# Patient Record
Sex: FEMALE | Race: ASIAN | NOT HISPANIC OR LATINO | URBAN - METROPOLITAN AREA
[De-identification: names, ages, dates, MRNs, and addresses within clinical notes are randomized per-mention and may not be internally consistent; named-entity substitution may affect disease eponyms.]

---

## 2022-01-14 ENCOUNTER — OUTPATIENT (OUTPATIENT)
Dept: OUTPATIENT SERVICES | Facility: HOSPITAL | Age: 27
LOS: 1 days | End: 2022-01-14

## 2022-01-14 ENCOUNTER — RESULT CHARGE (OUTPATIENT)
Age: 27
End: 2022-01-14

## 2022-01-14 ENCOUNTER — APPOINTMENT (OUTPATIENT)
Dept: OBGYN | Facility: HOSPITAL | Age: 27
End: 2022-01-14

## 2022-01-14 DIAGNOSIS — Z00.00 ENCOUNTER FOR GENERAL ADULT MEDICAL EXAMINATION W/OUT ABNORMAL FINDINGS: ICD-10-CM

## 2022-01-15 LAB
HCG UR QL: POSITIVE
QUALITY CONTROL: YES

## 2022-01-18 DIAGNOSIS — Z32.00 ENCOUNTER FOR PREGNANCY TEST, RESULT UNKNOWN: ICD-10-CM

## 2022-02-15 ENCOUNTER — APPOINTMENT (OUTPATIENT)
Dept: OBGYN | Facility: HOSPITAL | Age: 27
End: 2022-02-15

## 2022-05-27 ENCOUNTER — EMERGENCY (EMERGENCY)
Facility: HOSPITAL | Age: 27
LOS: 1 days | Discharge: HOME | End: 2022-05-27
Admitting: EMERGENCY MEDICINE
Payer: MEDICAID

## 2022-05-27 VITALS
TEMPERATURE: 98 F | RESPIRATION RATE: 16 BRPM | DIASTOLIC BLOOD PRESSURE: 75 MMHG | OXYGEN SATURATION: 99 % | HEART RATE: 84 BPM | SYSTOLIC BLOOD PRESSURE: 125 MMHG

## 2022-05-27 PROCEDURE — 99282 EMERGENCY DEPT VISIT SF MDM: CPT

## 2022-05-27 NOTE — ED PROVIDER NOTE - PROGRESS NOTE DETAILS
PA PAMELLA: Pt is medically stable for discharge and follow up with PMD & dental. The patient was given verbal and written discharge instructions. Specifically, instructions when to return to the ED and when to seek follow-up from their pcp was discussed. Any specialty follow-up was discussed, including how to make an appointment.  Instructions were discussed in simple, plain language and was understood by the patient. The patient understands that should their symptoms worsen or any new symptoms arise, they should return to the ED immediately for further evaluation. All pt's questions were answered. Patient verbalizes understanding. PA PAMELLA: Pt is medically stable for discharge and follow up with PMD & dental. Pt left ED w/o discharge paperwork. Unable to obtain fetal doppler.  Spoke with patient over the phone, states she left due to family emergency, unable to wait for paperwork. Informed discharge instructions over the phone, all questions answered, return precautions given.

## 2022-05-27 NOTE — ED PROVIDER NOTE - OBJECTIVE STATEMENT
27 yo F, with no significant PMH, currently 28 weeks pregnant, presents to ER c/o left lower tooth pain x2 days. Reports having intermittent left lower tooth pain, unable to sleep at night. Admits taking Tylenol for pain. Reports dental appointment next Friday, but couldn't wait. Admits fetal movements. Denies any fever, chills, facial swelling, weakness, numbness, drainage, n/v/d, abdominal pain, pelvic pain, vaginal bleeding, dysuria, or any other complaints.

## 2022-05-27 NOTE — ED PROVIDER NOTE - NSFOLLOWUPINSTRUCTIONS_ED_ALL_ED_FT
Take Tylenol 650mg (Two 325 mg pills) every 4-6 hours as needed for pain.     Follow up with the [facility] dental clinic as soon as possible.  Call [phone] for an appointment.  Please return to the ER for severe pain, fevers, severe bleeding, increased swelling or any other concerns.   Eat a soft diet and chew on the opposite side.  Brush your teeth several times a day.

## 2022-05-27 NOTE — ED PROVIDER NOTE - CLINICAL SUMMARY MEDICAL DECISION MAKING FREE TEXT BOX
27 yo F, with no significant PMH, currently 28 weeks pregnant, presents to ER c/o left lower tooth pain x2 days. well appearing female. Afebrile. No evidence of dental abscess. likely dental caries. Pt has no OB/GYN complaints at this time. Pt declined pain med including Tylenol, Oxycodone (discussed can affect fetus), antibiotic. Pt doesn't want to expose baby w/ dental X-ray. Plan: discharge w/ dental follow up.

## 2022-05-27 NOTE — ED PROVIDER NOTE - TOOTH FINDINGS
left lower tooth #20 with large cavity, mild tender to percussion, no gingival erythema, no fluctuance, no abscess

## 2022-05-27 NOTE — ED PROVIDER NOTE - PATIENT PORTAL LINK FT
You can access the FollowMyHealth Patient Portal offered by [facility] by registering at the following website: [email or web address]. By joining [email or web address]’s FollowMyHealth portal, you will also be able to view your health information using other applications (apps) compatible with our system.

## 2022-05-30 ENCOUNTER — EMERGENCY (EMERGENCY)
Facility: HOSPITAL | Age: 27
LOS: 1 days | Discharge: HOME | End: 2022-05-30
Attending: STUDENT IN AN ORGANIZED HEALTH CARE EDUCATION/TRAINING PROGRAM | Admitting: STUDENT IN AN ORGANIZED HEALTH CARE EDUCATION/TRAINING PROGRAM
Payer: MEDICAID

## 2022-05-30 VITALS
HEART RATE: 108 BPM | OXYGEN SATURATION: 100 % | SYSTOLIC BLOOD PRESSURE: 118 MMHG | RESPIRATION RATE: 16 BRPM | TEMPERATURE: 98 F | DIASTOLIC BLOOD PRESSURE: 71 MMHG

## 2022-05-30 PROBLEM — Z78.9 OTHER SPECIFIED HEALTH STATUS: Chronic | Status: ACTIVE | Noted: 2022-05-27

## 2022-05-30 PROCEDURE — 99283 EMERGENCY DEPT VISIT LOW MDM: CPT

## 2022-05-30 RX ADMIN — Medication 1 TABLET(S): at 11:45

## 2022-05-30 NOTE — ED PROVIDER NOTE - OBJECTIVE STATEMENT
25yo F 28wks pregnant p/w concern of L lower dental pain on known cavity, seen in ED several days ago and dc'ed, now w/ increased jaw swelling and pain on area.  Has not seen dentist yet. No fever, chills, neck swelling/pain, cp, sob, abd pain, vaginal bleeding.

## 2022-05-30 NOTE — ED ADULT NURSE NOTE - OBJECTIVE STATEMENT
A&Ox4. ambulatory. c/o left side dental pain. NAD. pt denies SOB, chest pain, dizziness, weakness, urinary symptoms, HA, n/v/d, fevers, chills. respirations are even and un labored. medication given as ordered. safety precautions maintained.

## 2022-05-30 NOTE — ED PROVIDER NOTE - CLINICAL SUMMARY MEDICAL DECISION MAKING FREE TEXT BOX
Dental pain, no singificant jaw swelling, no signs/symptoms of ludwigs or deep sace infx. No appreciable abscess for drainage, will give PO abx and dental referral for extraction and return precautions

## 2022-05-30 NOTE — ED ADULT TRIAGE NOTE - CHIEF COMPLAINT QUOTE
pt 28 weeks pregnant c/o left jaw pain with swelling that has increased from Friday when seen in ED last, pt unable to see dentist over the weekend. some relief with heat packs and Tylenol.

## 2022-05-30 NOTE — ED PROVIDER NOTE - PROGRESS NOTE DETAILS
Informed pt of plan for discharge with instructions to follow up with PMD. Pt/caregiver expressed understanding of plan and agrees with plan for discharge. Strict return precautions discussed with patient in layman's terms, patient demonstrated understanding of return precuations.

## 2022-05-30 NOTE — ED PROVIDER NOTE - PATIENT PORTAL LINK FT
You can access the FollowMyHealth Patient Portal offered by [facility] by registering at the following website: [email or web address]. By joining [employer]’s FollowMyHealth portal, you will also be able to view your health information using other applications (apps) compatible with our system.

## 2022-05-30 NOTE — ED PROVIDER NOTE - NSFOLLOWUPINSTRUCTIONS_ED_ALL_ED_FT
Follow up with the [facility] dental clinic as soon as possible.  Call [phone] for an appointment.  Please return to the ER for severe pain, fevers, severe bleeding, increased swelling or any other concerns.   Eat a soft diet and chew on the opposite side.  Brush your teeth several times a day.

## 2022-05-30 NOTE — ED PROVIDER NOTE - PHYSICAL EXAMINATION
VITALS: Initial triage and subsequent vitals have been reviewed by me.  Gen: Well appearing, NAD, alert, non-toxic  Head: NCAT  HEENT: MMM, normal conjunctiva, anicteric, neck supple, no significant mandibular swelling, no neck/submental/sublingual/submandibular edema/induration/erythema, dental caries L lower mandible, no appreciable significant edema/fluctuance or erythema  Lung: breathing comfortably  CV: RRR,  Abd: soft, NTND  MSK: No edema, no visible deformities  Neuro: Moving all extremity grossly, following commands appropriately, fluid speech  Skin: Warm and dry, no evidence of rash  Psych: normal mood and affect

## 2022-05-30 NOTE — ED PROVIDER NOTE - NS ED ROS FT
CONSTITUTIONAL: No fevers, no chills, no lightheadedness, no dizziness  EYES: no visual changes, no eye pain  EARS: no ear drainage, no ear pain, no change in hearing  NOSE: no nasal congestion  MOUTH/THROAT: no sore throat  CV: No chest pain, no palpitations  RESP: No SOB, no cough  GI: No n/v/d, no abd pain  : no dysuria, no hematuria, no flank pain  MSK: no back pain, no extremity pain  SKIN: no rashes  NEURO: no headache, no focal weakness, no decreased sensation/paresthesias   PSYCHIATRIC: no known mental health issues.

## 2022-06-13 ENCOUNTER — TRANSCRIPTION ENCOUNTER (OUTPATIENT)
Age: 27
End: 2022-06-13

## 2024-09-17 ENCOUNTER — EMERGENCY (EMERGENCY)
Facility: HOSPITAL | Age: 29
LOS: 1 days | Discharge: HOME | End: 2024-09-17
Admitting: EMERGENCY MEDICINE
Payer: SELF-PAY

## 2024-09-17 VITALS
SYSTOLIC BLOOD PRESSURE: 122 MMHG | DIASTOLIC BLOOD PRESSURE: 74 MMHG | WEIGHT: 132.06 LBS | OXYGEN SATURATION: 99 % | RESPIRATION RATE: 16 BRPM | HEART RATE: 73 BPM | TEMPERATURE: 98 F

## 2024-09-17 PROCEDURE — 99053 MED SERV 10PM-8AM 24 HR FAC: CPT

## 2024-09-17 PROCEDURE — 99285 EMERGENCY DEPT VISIT HI MDM: CPT

## 2024-09-17 NOTE — ED ADULT TRIAGE NOTE - CHIEF COMPLAINT QUOTE
pt complains of vaginal bleeding and abd cramping soaking approx. 2 pads an hour. pt is scheduled for DC on Saturday pt was told by OB " that the baby  had no heart beat"   at 9 weeks pregnant on 8/31  . pt denies blood thinner use,  changes in vision, chest pain, headache, nausea, dizziness, vomiting,  SOB, fever, and  chills. pt denies past medical hx.

## 2024-09-18 VITALS
OXYGEN SATURATION: 100 % | RESPIRATION RATE: 16 BRPM | TEMPERATURE: 99 F | DIASTOLIC BLOOD PRESSURE: 70 MMHG | SYSTOLIC BLOOD PRESSURE: 117 MMHG | HEART RATE: 70 BPM

## 2024-09-18 LAB
ALBUMIN SERPL ELPH-MCNC: 4.2 G/DL — SIGNIFICANT CHANGE UP (ref 3.3–5)
ALP SERPL-CCNC: 73 U/L — SIGNIFICANT CHANGE UP (ref 40–120)
ALT FLD-CCNC: 25 U/L — SIGNIFICANT CHANGE UP (ref 4–33)
ANION GAP SERPL CALC-SCNC: 14 MMOL/L — SIGNIFICANT CHANGE UP (ref 7–14)
APPEARANCE UR: ABNORMAL
APTT BLD: 31.1 SEC — SIGNIFICANT CHANGE UP (ref 24.5–35.6)
AST SERPL-CCNC: 29 U/L — SIGNIFICANT CHANGE UP (ref 4–32)
BACTERIA # UR AUTO: NEGATIVE /HPF — SIGNIFICANT CHANGE UP
BASOPHILS # BLD AUTO: 0.05 K/UL — SIGNIFICANT CHANGE UP (ref 0–0.2)
BASOPHILS NFR BLD AUTO: 0.3 % — SIGNIFICANT CHANGE UP (ref 0–2)
BILIRUB SERPL-MCNC: <0.2 MG/DL — SIGNIFICANT CHANGE UP (ref 0.2–1.2)
BILIRUB UR-MCNC: NEGATIVE — SIGNIFICANT CHANGE UP
BLD GP AB SCN SERPL QL: NEGATIVE — SIGNIFICANT CHANGE UP
BUN SERPL-MCNC: 12 MG/DL — SIGNIFICANT CHANGE UP (ref 7–23)
CALCIUM SERPL-MCNC: 8.5 MG/DL — SIGNIFICANT CHANGE UP (ref 8.4–10.5)
CAST: 1 /LPF — SIGNIFICANT CHANGE UP (ref 0–4)
CHLORIDE SERPL-SCNC: 98 MMOL/L — SIGNIFICANT CHANGE UP (ref 98–107)
CO2 SERPL-SCNC: 20 MMOL/L — LOW (ref 22–31)
COLOR SPEC: YELLOW — SIGNIFICANT CHANGE UP
CREAT SERPL-MCNC: 0.56 MG/DL — SIGNIFICANT CHANGE UP (ref 0.5–1.3)
DIFF PNL FLD: ABNORMAL
EGFR: 126 ML/MIN/1.73M2 — SIGNIFICANT CHANGE UP
EOSINOPHIL # BLD AUTO: 0.01 K/UL — SIGNIFICANT CHANGE UP (ref 0–0.5)
EOSINOPHIL NFR BLD AUTO: 0.1 % — SIGNIFICANT CHANGE UP (ref 0–6)
GLUCOSE SERPL-MCNC: 130 MG/DL — HIGH (ref 70–99)
GLUCOSE UR QL: NEGATIVE MG/DL — SIGNIFICANT CHANGE UP
HCG SERPL-ACNC: 612.2 MIU/ML — SIGNIFICANT CHANGE UP
HCT VFR BLD CALC: 32.9 % — LOW (ref 34.5–45)
HGB BLD-MCNC: 11.3 G/DL — LOW (ref 11.5–15.5)
IANC: 14.51 K/UL — HIGH (ref 1.8–7.4)
IMM GRANULOCYTES NFR BLD AUTO: 0.4 % — SIGNIFICANT CHANGE UP (ref 0–0.9)
INR BLD: 0.91 RATIO — SIGNIFICANT CHANGE UP (ref 0.85–1.18)
KETONES UR-MCNC: NEGATIVE MG/DL — SIGNIFICANT CHANGE UP
LEUKOCYTE ESTERASE UR-ACNC: ABNORMAL
LYMPHOCYTES # BLD AUTO: 1.03 K/UL — SIGNIFICANT CHANGE UP (ref 1–3.3)
LYMPHOCYTES # BLD AUTO: 6.4 % — LOW (ref 13–44)
MCHC RBC-ENTMCNC: 26 PG — LOW (ref 27–34)
MCHC RBC-ENTMCNC: 34.3 GM/DL — SIGNIFICANT CHANGE UP (ref 32–36)
MCV RBC AUTO: 75.6 FL — LOW (ref 80–100)
MONOCYTES # BLD AUTO: 0.49 K/UL — SIGNIFICANT CHANGE UP (ref 0–0.9)
MONOCYTES NFR BLD AUTO: 3 % — SIGNIFICANT CHANGE UP (ref 2–14)
NEUTROPHILS # BLD AUTO: 14.51 K/UL — HIGH (ref 1.8–7.4)
NEUTROPHILS NFR BLD AUTO: 89.8 % — HIGH (ref 43–77)
NITRITE UR-MCNC: NEGATIVE — SIGNIFICANT CHANGE UP
NRBC # BLD: 0 /100 WBCS — SIGNIFICANT CHANGE UP (ref 0–0)
NRBC # FLD: 0 K/UL — SIGNIFICANT CHANGE UP (ref 0–0)
PH UR: 5 — SIGNIFICANT CHANGE UP (ref 5–8)
PLATELET # BLD AUTO: 273 K/UL — SIGNIFICANT CHANGE UP (ref 150–400)
POTASSIUM SERPL-MCNC: 4 MMOL/L — SIGNIFICANT CHANGE UP (ref 3.5–5.3)
POTASSIUM SERPL-SCNC: 4 MMOL/L — SIGNIFICANT CHANGE UP (ref 3.5–5.3)
PROT SERPL-MCNC: 7.4 G/DL — SIGNIFICANT CHANGE UP (ref 6–8.3)
PROT UR-MCNC: 100 MG/DL
PROTHROM AB SERPL-ACNC: 10.2 SEC — SIGNIFICANT CHANGE UP (ref 9.5–13)
RBC # BLD: 4.35 M/UL — SIGNIFICANT CHANGE UP (ref 3.8–5.2)
RBC # FLD: 13.8 % — SIGNIFICANT CHANGE UP (ref 10.3–14.5)
RBC CASTS # UR COMP ASSIST: 1186 /HPF — HIGH (ref 0–4)
RH IG SCN BLD-IMP: POSITIVE — SIGNIFICANT CHANGE UP
SODIUM SERPL-SCNC: 132 MMOL/L — LOW (ref 135–145)
SP GR SPEC: 1.03 — HIGH (ref 1–1.03)
SQUAMOUS # UR AUTO: 3 /HPF — SIGNIFICANT CHANGE UP (ref 0–5)
UROBILINOGEN FLD QL: 1 MG/DL — SIGNIFICANT CHANGE UP (ref 0.2–1)
WBC # BLD: 16.15 K/UL — HIGH (ref 3.8–10.5)
WBC # FLD AUTO: 16.15 K/UL — HIGH (ref 3.8–10.5)
WBC UR QL: 4 /HPF — SIGNIFICANT CHANGE UP (ref 0–5)

## 2024-09-18 PROCEDURE — 76801 OB US < 14 WKS SINGLE FETUS: CPT | Mod: 26

## 2024-09-18 NOTE — ED PROVIDER NOTE - OBJECTIVE STATEMENT
Patient is a 32-year-old G2P 1-year-old approximately 9-week pregnant female who presented to ED with vaginal bleeding.  Patient reports she had an ultrasound 2 weeks ago and was told that she would be having a miscarriage as there was no fetal heart rate.  Patient reports she has D&C scheduled for this upcoming Saturday.  Patient reports today she developed significant vaginal bleeding feels like she is soaking through approximately 2 pads per hour and passing large clots with associated cramping. Otherwise denies fevers, chills, chest pain, palpitations, shortness of breath.

## 2024-09-18 NOTE — ED PROVIDER NOTE - PATIENT PORTAL LINK FT
You can access the FollowMyHealth Patient Portal offered by [facility] by registering at the following website: [email or web address]. By joining [employer]’s FollowMyHealth portal, you will also be able to view your health information using other applications (apps) compatible with our system.

## 2024-09-18 NOTE — ED ADULT NURSE NOTE - OBJECTIVE STATEMENT
Pt received in intake rm #4, 28Y F Aox4 ambulatory. Reports approx 9 weeks pregnant  goes to [facility]. States scheduled for D&C this upcoming saturday due to active miscarriage. Endorsing worsening vaginal bleeding x2 pads per hours and endorsing generalized weakness. Denies sob, chest pain. IV placed 20G Rt AC labs sent, VS as charted, pt respirations even and unlabored, appears in NAD, taken to US at this time with transport

## 2024-09-18 NOTE — ED ADULT NURSE NOTE - NS ED NURSE IV DC DT
PAT PASS GIVEN/REVIEWED WITH PT.  VERBALIZED UNDERSTANDING OF THE FOLLOWING:  DO NOT EAT, DRINK, SMOKE, USE SMOKELESS TOBACCO OR CHEW GUM AFTER MIDNIGHT THE NIGHT BEFORE SURGERY.  THIS ALSO INCLUDES HARD CANDIES AND MINTS.    DO NOT SHAVE THE AREA TO BE OPERATED ON AT LEAST 48 HOURS PRIOR TO THE PROCEDURE.  DO NOT WEAR MAKE UP OR NAIL POLISH.  DO NOT LEAVE IN ANY PIERCING OR WEAR JEWELRY THE DAY OF SURGERY.      DO NOT USE ADHESIVES IF YOU WEAR DENTURES.    DO NOT WEAR EYE CONTACTS; BRING IN YOUR GLASSES.    ONLY TAKE MEDICATION THE MORNING OF YOUR PROCEDURE IF INSTRUCTED BY YOUR SURGEON WITH ENOUGH WATER TO SWALLOW THE MEDICATION.  IF YOUR SURGEON DID NOT SPECIFY WHICH MEDICATIONS TO TAKE, YOU WILL NEED TO CALL THEIR OFFICE FOR FURTHER INSTRUCTIONS AND DO AS THEY INSTRUCT.    LEAVE ANYTHING YOU CONSIDER VALUABLE AT HOME.    YOU WILL NEED TO ARRANGE FOR SOMEONE TO DRIVE YOU HOME AFTER SURGERY.  IT IS RECOMMENDED THAT YOU DO NOT DRIVE, WORK, DRINK ALCOHOL OR MAKE MAJOR DECISIONS FOR AT LEAST 24 HOURS AFTER YOUR PROCEDURE IS COMPLETE.      THE DAY OF YOUR PROCEDURE, BRING IN THE FOLLOWING IF APPLICABLE:   PICTURE ID AND INSURANCE/MEDICARE OR MEDICAID CARDS/ANY CO-PAY THAT MAY BE DUE   COPY OF ADVANCED DIRECTIVE/LIVING WILL/POWER OR    CPAP/BIPAP/INHALERS   SKIN PREP SHEET   YOUR PREADMISSION TESTING PASS (IF NOT A PHONE HISTORY)    Medication instructions given to pt by RN per anesthesia protocol.  Pt referred back to surgeon for further instructions if he/she is on any blood thinners.          Chlorhexadine wipes along with instruction/verification sheet given to pt.  Instructed pt to date, time, and initial the verification sheet once skin prep has been  completed, and [facility].  Pt. Verbalizes understanding.     Introduction to anesthesia video viewed by pt in PAT.    
18-Sep-2024 05:29

## 2024-09-18 NOTE — CONSULT NOTE ADULT - SUBJECTIVE AND OBJECTIVE BOX
ARIS AMARAL  28y  Female [id number]    HPI:        Name of GYN Physician:     POB:      Pgyn: Denies fibroids, cysts, endometriosis, STI's, Abnormal pap smears     Home meds:     Hospital Meds:   MEDICATIONS  (STANDING):    MEDICATIONS  (PRN):      Allergies    No Known Allergies    Intolerances        PAST MEDICAL & SURGICAL HISTORY:  No pertinent past medical history      No significant past surgical history          FAMILY HISTORY:      Social History:  Denies smoking use, drug use, alcohol use.   +occasional social alcohol use    Vital Signs Last 24 Hrs  T(C): 37.1 (18 Sep 2024 01:23), Max: 37.1 (18 Sep 2024 01:23)  T(F): 98.7 (18 Sep 2024 01:23), Max: 98.7 (18 Sep 2024 01:23)  HR: 70 (18 Sep 2024 01:23) (70 - 73)  BP: 117/70 (18 Sep 2024 01:23) (117/70 - 122/74)  BP(mean): --  RR: 16 (18 Sep 2024 01:23) (16 - 16)  SpO2: 100% (18 Sep 2024 01:23) (99% - 100%)    Parameters below as of 18 Sep 2024 01:23  Patient On (Oxygen Delivery Method): room air        Physical Exam:   General: sitting comfortably in bed, NAD   CV: RR S1S2 no m/r/g  Lungs: CTA b/l, good air flow b/l   Back: No CVA tenderness  Abd: Soft, non-tender, non-distended.  Bowel sounds present.    :  No bleeding on pad.    External labia wnl.  Bimanual exam with no cervical motion tenderness, uterus wnl, adnexa non palpable b/l.  Cervix closed vs. Cervix dilated    cm   Speculum Exam: No active bleeding from os.  Physiologic discharge.    Ext: non-tender b/l, no edema     LABS:                              11.3   16.15 )-----------( 273      ( 18 Sep 2024 01:10 )             32.9     09-18    132[L]  |  98  |  12  ----------------------------<  130[H]  4.0   |  20[L]  |  0.56    Ca    8.5      18 Sep 2024 01:10    TPro  7.4  /  Alb  4.2  /  TBili  <0.2  /  DBili  x   /  AST  29  /  ALT  25  /  AlkPhos  73  09-18    I&O's Detail    PT/INR - ( 18 Sep 2024 01:10 )   PT: 10.2 sec;   INR: 0.91 ratio         PTT - ( 18 Sep 2024 01:10 )  PTT:31.1 sec  Urinalysis Basic - ( 18 Sep 2024 01:10 )    Color: Yellow / Appearance: Cloudy / S.032 / pH: x  Gluc: 130 mg/dL / Ketone: Negative mg/dL  / Bili: Negative / Urobili: 1.0 mg/dL   Blood: x / Protein: 100 mg/dL / Nitrite: Negative   Leuk Esterase: Trace / RBC: 1186 /HPF / WBC 4 /HPF   Sq Epi: x / Non Sq Epi: 3 /HPF / Bacteria: Negative /HPF        RADIOLOGY & ADDITIONAL STUDIES: ARIS AMARAL  28y  Female [id number]    HPI: 28y  LMP 6/5 @13w4d with diagnosed nonviable IUP two weeks ago with plan for outpatient D&C on  presenting with vaginal bleeding and passage of clots. Patient states she started passing clots and soaking 2 pads per hour overnight. Has since slowed down, now with small stripe when wiping. Otherwise denies fevers/chills. Denies lightheadedness/dizziness. Denies abnormal discharge, urinary symptoms, changes in bowel habits.    Name of GYN Physician: [facility]    POB: FT C/S  Pgyn: Denies fibroids, cysts, endometriosis, STI's, Abnormal pap smears   PMH: Denies  PSH: C/S  Meds: Magnesium  All: NKDA      Vital Signs Last 24 Hrs  T(C): 37.1 (18 Sep 2024 01:23), Max: 37.1 (18 Sep 2024 01:)  T(F): 98.7 (18 Sep 2024 01:), Max: 98.7 (18 Sep 2024 01:)  HR: 70 (18 Sep 2024 01:23) (70 - 73)  BP: 117/70 (18 Sep 2024 01:) (117/70 - 122/74)  BP(mean): --  RR: 16 (18 Sep 2024 01:23) (16 - 16)  SpO2: 100% (18 Sep 2024 01:23) (99% - 100%)    Parameters below as of 18 Sep 2024 01:23  Patient On (Oxygen Delivery Method): room air        Physical Exam:   General: sitting comfortably in bed, NAD   CV: RR  Lungs: Normal respiratory effort  Abd: Soft, non-tender, non-distended.     :  Scant bleeding on pad. External labia wnl.  Bimanual exam with no cervical motion tenderness, uterus wnl approx 13w sized, adnexa non palpable b/l. Cervix dilated 0.5 cm   Speculum Exam: 15cc in vault, cleared and cervix visualized. No active bleeding or products visualized from os. Physiologic discharge.    Ext: non-tender b/l, no edema     LABS:                              11.3   16.15 )-----------( 273      ( 18 Sep 2024 01:10 )             32.9     09-18    132[L]  |  98  |  12  ----------------------------<  130[H]  4.0   |  20[L]  |  0.56    Ca    8.5      18 Sep 2024 01:10    TPro  7.4  /  Alb  4.2  /  TBili  <0.2  /  DBili  x   /  AST  29  /  ALT  25  /  AlkPhos  73      I&O's Detail    PT/INR - ( 18 Sep 2024 01:10 )   PT: 10.2 sec;   INR: 0.91 ratio         PTT - ( 18 Sep 2024 01:10 )  PTT:31.1 sec  Urinalysis Basic - ( 18 Sep 2024 01:10 )    Color: Yellow / Appearance: Cloudy / S.032 / pH: x  Gluc: 130 mg/dL / Ketone: Negative mg/dL  / Bili: Negative / Urobili: 1.0 mg/dL   Blood: x / Protein: 100 mg/dL / Nitrite: Negative   Leuk Esterase: Trace / RBC: 1186 /HPF / WBC 4 /HPF   Sq Epi: x / Non Sq Epi: 3 /HPF / Bacteria: Negative /HPF        RADIOLOGY & ADDITIONAL STUDIES:  < from: US OB <=14 Weeks, First Gestation (24 @ 02:02) >    ACC: [id number] EXAM:  US OB LES THAN 14 WKS 1ST GEST   ORDERED BY: LEVAR WHITTAKER     PROCEDURE DATE:  2024          INTERPRETATION:  CLINICAL INFORMATION: Vaginal bleeding with confirmed   intrauterine pregnancy without fetal heart rate as an outpatient.    LMP: 06/15/2024    Estimated Gestational Age by LMP: 13 weeks 4 days    COMPARISON: None available.    Transabdominal pelvic sonogram only.    FINDINGS:  Uterus and endometrium: Uterus measures 13.6 x 5.6 x 7.8 cm. No   definitive intrauterine gestational sac. Large amount of heterogeneous   avascular debris distending the endometrium in the lower uterine segment.    Right ovary: 3.0 cm x 1.8 cm x 1.6 cm. Within normal limits.   Flow/waveforms documented.  Left ovary: 5.3 cm x 2.6 cm x 3.7 cm. 2.8 cm cyst. Flow/waveforms   documented in the periphery of the left ovary.    Fluid: None.    IMPRESSION:  No definitive intrauterine gestational sac. Large amount of heterogeneous   avascular debris distending the endometrium in the lower uterine segment   likely representing blood products. Recommend serial beta hCG and short   interval follow-up sonogram.    --- End of Report ---    < end of copied text >

## 2024-09-18 NOTE — ED PROVIDER NOTE - PROGRESS NOTE DETAILS
CHARLENE London: pt with no IUP on TVUS  Discussed with OBGYN- pt refused medication- no active bleeding on their exam- recommended outpatient follow up for d&c which pt has scheduled for today- afebrile, hemodynamically stable- h/h11.3/32.9, Rh +  pt aware and in agreement with plan

## 2024-09-18 NOTE — ED PROVIDER NOTE - NSFOLLOWUPINSTRUCTIONS_ED_ALL_ED_FT
Follow up with your OBGYN for D&C  Return to ER if any worsening symptoms, dizziness, chest pain, palpitations, shortness of breath, syncope, soaking through >1 pad per hour or any other concerns

## 2024-09-18 NOTE — CONSULT NOTE ADULT - ASSESSMENT
- No acute GYN intervention  - Follow up with GYN provider for scheduled D&C 9/21  - Patient cleared from GYN perspective    d/w Dr. Jesus Watters, PGY-2 A/P: 28y  LMP 6/5 @13w4d with diagnosed nonviable IUP two weeks ago with plan for outpatient D&C on  presenting with vaginal bleeding and passage of clots. On exam, no active bleeding, small amount of blood in vault. Leukocytosis of 16.15 however afebrile and without uterine tenderness or abnormal discharge. H/H 11.3/32.9. On TVUS large amount of heterogeneous avascular debris distending the endometrium in the lower uterine segment. Patient currently without indication for emergent D&C at this time given lack of evidence of infection and current light bleeding. Patient to follow up with GYN provider for previously scheduled D&C.  - No acute GYN intervention  - Follow up with GYN provider for scheduled D&C . Patient informed to contact her private GYN provider to inform them of today's visit.  - Patient given return precautions including heavy bleeidng, fevers, abnormal discharge, increased uterine discomfort   - Patient cleared from GYN perspective, remainder of management per ED    d/w Dr. Jesus Watters, PGY-2